# Patient Record
Sex: MALE | Race: AMERICAN INDIAN OR ALASKA NATIVE
[De-identification: names, ages, dates, MRNs, and addresses within clinical notes are randomized per-mention and may not be internally consistent; named-entity substitution may affect disease eponyms.]

---

## 2018-05-28 ENCOUNTER — HOSPITAL ENCOUNTER (EMERGENCY)
Dept: HOSPITAL 5 - ED | Age: 23
Discharge: HOME | End: 2018-05-28
Payer: SELF-PAY

## 2018-05-28 VITALS — SYSTOLIC BLOOD PRESSURE: 105 MMHG | DIASTOLIC BLOOD PRESSURE: 66 MMHG

## 2018-05-28 DIAGNOSIS — R21: Primary | ICD-10-CM

## 2018-05-28 DIAGNOSIS — R94.31: ICD-10-CM

## 2018-05-28 LAB
BUN SERPL-MCNC: 16 MG/DL (ref 9–20)
BUN/CREAT SERPL: 27 %
CALCIUM SERPL-MCNC: 9 MG/DL (ref 8.4–10.2)
HCT VFR BLD CALC: 51.6 % (ref 35.5–45.6)
HEMOLYSIS INDEX: 94
HGB BLD-MCNC: 16.4 GM/DL (ref 11.8–15.2)
MCH RBC QN AUTO: 26 PG (ref 28–32)
MCHC RBC AUTO-ENTMCNC: 32 % (ref 32–34)
MCV RBC AUTO: 81 FL (ref 84–94)
PLATELET # BLD: 207 K/MM3 (ref 140–440)
RBC # BLD AUTO: 6.38 M/MM3 (ref 3.65–5.03)

## 2018-05-28 PROCEDURE — 71045 X-RAY EXAM CHEST 1 VIEW: CPT

## 2018-05-28 PROCEDURE — 80048 BASIC METABOLIC PNL TOTAL CA: CPT

## 2018-05-28 PROCEDURE — 36415 COLL VENOUS BLD VENIPUNCTURE: CPT

## 2018-05-28 PROCEDURE — 85027 COMPLETE CBC AUTOMATED: CPT

## 2018-05-28 PROCEDURE — 93010 ELECTROCARDIOGRAM REPORT: CPT

## 2018-05-28 PROCEDURE — 84484 ASSAY OF TROPONIN QUANT: CPT

## 2018-05-28 PROCEDURE — 82550 ASSAY OF CK (CPK): CPT

## 2018-05-28 PROCEDURE — 99284 EMERGENCY DEPT VISIT MOD MDM: CPT

## 2018-05-28 PROCEDURE — 96374 THER/PROPH/DIAG INJ IV PUSH: CPT

## 2018-05-28 PROCEDURE — 96375 TX/PRO/DX INJ NEW DRUG ADDON: CPT

## 2018-05-28 PROCEDURE — 93005 ELECTROCARDIOGRAM TRACING: CPT

## 2018-05-28 NOTE — EMERGENCY DEPARTMENT REPORT
ED General Adult HPI





- General


Chief complaint: Allergic Reaction


Stated complaint: ALLERGIC REACTION


Time Seen by Provider: 05/28/18 06:56


Source: patient, RN notes reviewed


Mode of arrival: Ambulatory


Limitations: No Limitations





- History of Present Illness


Initial comments: 





This is a 22-year-old male who is unknown to this provider, presenting to the 

ER with an allergic reaction.  He complains of diffuse body rash, which has 

been present for 2 days, started on his chest, then went to his arms and went 

to his legs.  He and also endorses some lip swelling.  There is no intraoral 

involvement.  








 his symptoms are constant, they do not radiate anywhere, and he endorses no 

exacerbating or relieving factors.





He specifically endorses no new creams, colognes, travel, although he thinks he 

recently had exposure to pets at a friend's house.  Patient also indicates 

central chest tightness which has been present for a few hours, it does not 

radiate anywhere, and has no exacerbating or relieving factors, he further 

endorses no cocaine use, recent aspirin use, DVT or pulmonary embolus risk 

factors.




















-: Gradual


Location: mouth, chest, back, abdomen, left, right, upper extremity, lower 

extremity


Severity scale (0 -10): 4


Consistency: constant


Improves with: none


Worsens with: none


Associated Symptoms: denies other symptoms, chest pain, rash.  denies: confusion

, cough, diaphoresis, fever/chills, headaches, loss of appetite, malaise, nausea

/vomiting, seizure, shortness of breath, syncope, weakness





- Related Data


 Previous Rx's











 Medication  Instructions  Recorded  Last Taken  Type


 


Acetaminophen/Codeine [Tylenol #3] 1 tab PO Q8H PRN #15 tablet 08/06/15 Unknown 

Rx


 


Penicillin Vk [Veetids TAB] 500 mg PO Q8H #30 tablet 08/06/15 Unknown Rx


 


EPINEPHrine [Epipen 2-Jayson] 0.3 mg IM DAILY PRN #2 ml 05/28/18 Unknown Rx


 


Famotidine [Pepcid] 20 mg PO BID #10 tablet 05/28/18 Unknown Rx


 


diphenhydrAMINE [Benadryl] 50 mg PO Q8HR PRN #20 capsule 05/28/18 Unknown Rx


 


predniSONE [Deltasone] 40 mg PO QDAY #8 tab 05/28/18 Unknown Rx











 Allergies











Allergy/AdvReac Type Severity Reaction Status Date / Time


 


No Known Allergies Allergy   Verified 08/05/15 20:37














ED Review of Systems


ROS: 


Stated complaint: ALLERGIC REACTION


Other details as noted in HPI





Comment: All other systems reviewed and negative





ED Past Medical Hx





- Past Medical History


Previous Medical History?: No





- Surgical History


Past Surgical History?: No





- Social History


Smoking Status: Never Smoker


Substance Use Type: None





- Medications


Home Medications: 


 Home Medications











 Medication  Instructions  Recorded  Confirmed  Last Taken  Type


 


Acetaminophen/Codeine [Tylenol #3] 1 tab PO Q8H PRN #15 tablet 08/06/15  

Unknown Rx


 


Penicillin Vk [Veetids TAB] 500 mg PO Q8H #30 tablet 08/06/15  Unknown Rx


 


EPINEPHrine [Epipen 2-Jayson] 0.3 mg IM DAILY PRN #2 ml 05/28/18  Unknown Rx


 


Famotidine [Pepcid] 20 mg PO BID #10 tablet 05/28/18  Unknown Rx


 


diphenhydrAMINE [Benadryl] 50 mg PO Q8HR PRN #20 capsule 05/28/18  Unknown Rx


 


predniSONE [Deltasone] 40 mg PO QDAY #8 tab 05/28/18  Unknown Rx














ED Physical Exam





- General


Limitations: No Limitations


General appearance: alert, in no apparent distress





- Head


Head exam: Present: atraumatic, normocephalic





- Eye


Eye exam: Present: normal appearance, PERRL, EOMI.  Absent: nystagmus





- ENT


ENT exam: Present: normal exam, normal orophraynx, mucous membranes moist, 

normal external ear exam, other (there is minimal superficial lip swelling.  

There is no stridor or trismus.  There is no swelling of the tongue.  There is 

no uvula involvement.  The patient is speaking in full sentences)





- Neck


Neck exam: Present: normal inspection, full ROM





- Respiratory


Respiratory exam: Present: normal lung sounds bilaterally.  Absent: respiratory 

distress





- Cardiovascular


Cardiovascular Exam: Present: regular rate, normal rhythm, normal heart sounds.

  Absent: bradycardia, tachycardia, irregular rhythm, systolic murmur, 

diastolic murmur, rubs, gallop





- GI/Abdominal


GI/Abdominal exam: Present: soft, normal bowel sounds.  Absent: distended, 

tenderness, guarding, rebound, rigid, pulsatile mass





- Rectal


Rectal exam: Present: deferred





- Extremities Exam


Extremities exam: Present: full ROM, normal capillary refill.  Absent: 

tenderness, pedal edema, joint swelling, calf tenderness





- Back Exam


Back exam: Present: normal inspection, full ROM.  Absent: tenderness, CVA 

tenderness (R), paraspinal tenderness, vertebral tenderness





- Neurological Exam


Neurological exam: Present: alert, oriented X3, CN II-XII intact, normal gait, 

other (Extraocular movements intact.  Tongue midline.  No facial droop.  Facial 

sensation intact to light touch in the V1, V2, V3 distribution bilaterally.  5 

and 5 strength in 4 extremities..  Sensation is intact to light touch in 4 

extremities.).  Absent: motor sensory deficit





- Psychiatric


Psychiatric exam: Present: normal affect, normal mood





- Skin


Skin exam: Present: warm, rash, urticaria, other (diffuse well-circumscribed 

macules which are nontender and saniya.  These are on the arms, legs, chest, 

abdomen.  There is no purpura.  There is no ecchymosis.)





ED Course


 Vital Signs











  05/28/18 05/28/18 05/28/18





  06:17 06:29 06:46


 


Temperature 97.8 F 97.8 F 


 


Pulse Rate 110 H 114 H 85


 


Respiratory 18 17 19





Rate   


 


Blood Pressure 84/57 103/55 103/68


 


Blood Pressure   





[Right]   


 


O2 Sat by Pulse 99 100 92





Oximetry   














  05/28/18





  06:53


 


Temperature 97.9 F


 


Pulse Rate 79


 


Respiratory 19





Rate 


 


Blood Pressure 


 


Blood Pressure 103/68





[Right] 


 


O2 Sat by Pulse 97





Oximetry 














- Reevaluation(s)


Reevaluation #1: 





05/28/18 11:51


Troponin negative 2, EKG unchanged 2, patient resting comfortably in no 

distress, he will be discharged to follow up, return precautions are reviewed.





ED Medical Decision Making





- Lab Data


Result diagrams: 


 05/28/18 07:30





 05/28/18 07:30








 Vital Signs











  05/28/18 05/28/18 05/28/18





  06:17 06:29 06:46


 


Temperature 97.8 F 97.8 F 


 


Pulse Rate 110 H 114 H 85


 


Respiratory 18 17 19





Rate   


 


Blood Pressure 84/57 103/55 103/68


 


Blood Pressure   





[Right]   


 


O2 Sat by Pulse 99 100 92





Oximetry   














  05/28/18





  06:53


 


Temperature 97.9 F


 


Pulse Rate 79


 


Respiratory 19





Rate 


 


Blood Pressure 


 


Blood Pressure 103/68





[Right] 


 


O2 Sat by Pulse 97





Oximetry 











 Lab Results











  05/28/18 05/28/18 05/28/18 Range/Units





  07:30 07:30 07:30 


 


WBC   12.7 H   (4.5-11.0)  K/mm3


 


RBC   6.38 H   (3.65-5.03)  M/mm3


 


Hgb   16.4 H   (11.8-15.2)  gm/dl


 


Hct   51.6 H   (35.5-45.6)  %


 


MCV   81 L   (84-94)  fl


 


MCH   26 L   (28-32)  pg


 


MCHC   32   (32-34)  %


 


RDW   14.8   (13.2-15.2)  %


 


Plt Count   207   (140-440)  K/mm3


 


Sodium  134 L    (137-145)  mmol/L


 


Potassium  4.5    (3.6-5.0)  mmol/L


 


Chloride  95.8 L    ()  mmol/L


 


Carbon Dioxide  28    (22-30)  mmol/L


 


Anion Gap  15    mmol/L


 


BUN  16    (9-20)  mg/dL


 


Creatinine  0.6 L    (0.8-1.5)  mg/dL


 


Estimated GFR  > 60    ml/min


 


BUN/Creatinine Ratio  27    %


 


Glucose  103 H    ()  mg/dL


 


Calcium  9.0    (8.4-10.2)  mg/dL


 


Total Creatine Kinase    57  ()  units/L


 


Troponin T  < 0.010    (0.00-0.029)  ng/mL














  05/28/18 Range/Units





  09:10 


 


WBC   (4.5-11.0)  K/mm3


 


RBC   (3.65-5.03)  M/mm3


 


Hgb   (11.8-15.2)  gm/dl


 


Hct   (35.5-45.6)  %


 


MCV   (84-94)  fl


 


MCH   (28-32)  pg


 


MCHC   (32-34)  %


 


RDW   (13.2-15.2)  %


 


Plt Count   (140-440)  K/mm3


 


Sodium   (137-145)  mmol/L


 


Potassium   (3.6-5.0)  mmol/L


 


Chloride   ()  mmol/L


 


Carbon Dioxide   (22-30)  mmol/L


 


Anion Gap   mmol/L


 


BUN   (9-20)  mg/dL


 


Creatinine   (0.8-1.5)  mg/dL


 


Estimated GFR   ml/min


 


BUN/Creatinine Ratio   %


 


Glucose   ()  mg/dL


 


Calcium   (8.4-10.2)  mg/dL


 


Total Creatine Kinase   ()  units/L


 


Troponin T  < 0.010  (0.00-0.029)  ng/mL














- EKG Data


-: EKG Interpreted by Me





- EKG Data


When compared to previous EKG there are: previous EKG unavailable





05/28/18 09:04


Normal sinus, 89 bpm, normal axis, QTC prolonged, incomplete right bundle-

branch block, borderline atrial enlargement, abnormal EKG, not consistent with 

a STEMI





- Radiology Data


Radiology results: pending, image reviewed


interpreted by me: 





X-ray of the chest, interpreted by me, no acute disease





- Medical Decision Making











Differential diagnosis, including but not limited to: Allergic reaction, 

pneumonia, acute coronary syndrome, GERD, gastritis, hiatal hernia











Assessment and plan: 22-year-old male with 2 complaints.  His initial complaint 

appears to be consistent with an undifferentiated allergic reaction.  He has 

diffuse macules, no lesions on the palms or soles, and superficial lip swelling 

without any intraoral involvement.  He is afebrile with reassuring vital signs, 

clinically sober, and speaking in full sentences.  He'll be treated with an 

allergy cocktail and observed.











Patient also describes nonspecific chest tightness which started at 7:00 in the 

morning.  There are no DVT or pulmonary embolus risk factors he is low risk by 

well's criteria, he is low risk by heart score, he is low risk by ADELFO score.  

We will obtain EKG 2, troponin 2, he can follow up with outpatient cardiology 

for his incidental abnormal EKG.


Critical care attestation.: 


If time is entered above; I have spent that time in minutes in the direct care 

of this critically ill patient, excluding procedure time.








ED Disposition


Clinical Impression: 


 Abnormal EKG, Rash





Disposition: DC-01 TO HOME OR SELFCARE


Is pt being admited?: No


Does the pt Need Aspirin: No


Condition: Stable


Instructions:  Electrocardiogram (GEN), Anaphylaxis (ED)


Additional Instructions: 


Follow-up with a cardiologist within the next week for your abnormal EKG.  Take 

the medications as directed.








Follow up with the primary care doctor within the next month.  Return to the ER 

right away with new pain, worsened pain, migration of pain, fevers, chills, 

lethargy, irritability, projectile vomiting and change in mental status, 

confusion, inability to tolerate feeds.


Referrals: 


PRIMARY CARE,MD [Primary Care Provider] - 3-5 Days


[facility] [Provider Group] - 3-5 Days


[facility] [Provider Group] - 3-5 Days


CHRISSY SOTELO MD [Staff Physician] - 3-5 Days

## 2020-10-08 ENCOUNTER — HOSPITAL ENCOUNTER (EMERGENCY)
Dept: HOSPITAL 5 - ED | Age: 25
Discharge: LEFT BEFORE DISCHARGE | End: 2020-10-08
Payer: SELF-PAY

## 2020-10-08 VITALS — SYSTOLIC BLOOD PRESSURE: 122 MMHG | DIASTOLIC BLOOD PRESSURE: 66 MMHG

## 2020-10-08 DIAGNOSIS — T65.91XA: ICD-10-CM
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
## 2020-10-08 DIAGNOSIS — R41.82: Primary | ICD-10-CM

## 2020-10-08 LAB
ALBUMIN SERPL-MCNC: 4.8 G/DL (ref 3.9–5)
ALT SERPL-CCNC: 15 UNITS/L (ref 7–56)
BASOPHILS # (AUTO): 0.1 K/MM3 (ref 0–0.1)
BASOPHILS NFR BLD AUTO: 0.8 % (ref 0–1.8)
BUN SERPL-MCNC: 15 MG/DL (ref 9–20)
BUN/CREAT SERPL: 17 %
CALCIUM SERPL-MCNC: 9.5 MG/DL (ref 8.4–10.2)
EOSINOPHIL # BLD AUTO: 0.1 K/MM3 (ref 0–0.4)
EOSINOPHIL NFR BLD AUTO: 1.6 % (ref 0–4.3)
HCT VFR BLD CALC: 42.2 % (ref 35.5–45.6)
HEMOLYSIS INDEX: 11
HGB BLD-MCNC: 13.6 GM/DL (ref 11.8–15.2)
LYMPHOCYTES # BLD AUTO: 2.5 K/MM3 (ref 1.2–5.4)
LYMPHOCYTES NFR BLD AUTO: 26.9 % (ref 13.4–35)
MCHC RBC AUTO-ENTMCNC: 32 % (ref 32–34)
MCV RBC AUTO: 79 FL (ref 84–94)
MONOCYTES # (AUTO): 0.7 K/MM3 (ref 0–0.8)
MONOCYTES % (AUTO): 7.2 % (ref 0–7.3)
PLATELET # BLD: 192 K/MM3 (ref 140–440)
RBC # BLD AUTO: 5.37 M/MM3 (ref 3.65–5.03)

## 2020-10-08 PROCEDURE — 36415 COLL VENOUS BLD VENIPUNCTURE: CPT

## 2020-10-08 PROCEDURE — 85025 COMPLETE CBC W/AUTO DIFF WBC: CPT

## 2020-10-08 PROCEDURE — 99285 EMERGENCY DEPT VISIT HI MDM: CPT

## 2020-10-08 PROCEDURE — 80320 DRUG SCREEN QUANTALCOHOLS: CPT

## 2020-10-08 PROCEDURE — 80053 COMPREHEN METABOLIC PANEL: CPT

## 2020-10-08 PROCEDURE — 93005 ELECTROCARDIOGRAM TRACING: CPT

## 2020-10-08 PROCEDURE — 82550 ASSAY OF CK (CPK): CPT

## 2020-10-08 PROCEDURE — G0480 DRUG TEST DEF 1-7 CLASSES: HCPCS

## 2020-10-08 PROCEDURE — 96360 HYDRATION IV INFUSION INIT: CPT

## 2020-10-08 PROCEDURE — 82140 ASSAY OF AMMONIA: CPT

## 2020-10-08 PROCEDURE — 70450 CT HEAD/BRAIN W/O DYE: CPT

## 2020-10-08 NOTE — CAT SCAN REPORT
CT head/brain wo con



INDICATION:

Altered Mental Status.



TECHNIQUE:

All CT scans at this location are performed using CT dose reduction for ALARA by means of automated e
xposure control. 



COMPARISON:

None available.



FINDINGS:

Imaged paranasal and mastoid sinuses are clear. Ventricles are symmetrical and normal in size. No mas
s, hemorrhage or other significant abnormality.



IMPRESSION:

1. Negative study. 



Signer Name: Satnam Mcdonough MD 

Signed: 10/8/2020 3:21 AM

Workstation Name: [id number]

## 2020-10-08 NOTE — EMERGENCY DEPARTMENT REPORT
ED Altered Mental Status HPI





- General


Chief Complaint: Overdose


Stated Complaint: AMS


PUI?: No


Time Seen by Provider: 10/08/20 02:11


Source: EMS


Mode of arrival: Stretcher


Limitations: Altered Mental Status





- History of Present Illness


Initial Comments: 





Patient is a 24-year-old male that presents emergency room with EMS for ove

rdose, altered mental status.  EMS report received.  EMS states that the patient

was alert and oriented x2 and confused however once they arrived to the ER the 

patient became nonverbal and minimally responsive.  Patient was brought in from 

a local hotel.  Patient told EMS that he was using marijuana only.  





Patient is minimally responsive.  Patient is not respond to painful stimuli.  

Ammonia salts placed over the patient's nose and the patient woke up.  Patient 

is verbal.  Patient is now alert and oriented x3.  Patient states he was using 

meth and marijuana and then took GHB to come down..











MD Complaint: altered mental status, decreased responsiveness


-: Sudden


Severity: severe


Consistency of Symptoms: waxing and waning, constant





- Related Data


                                    Allergies











Allergy/AdvReac Type Severity Reaction Status Date / Time


 


No Known Allergies Allergy   Unverified 10/08/20 03:19














ED Review of Systems


ROS: 


Stated complaint: AMS


Other details as noted in HPI





Comment: All other systems reviewed and negative





ED Past Medical Hx





- Past Medical History


Previous Medical History?: No


Additional medical history: Unknown





- Surgical History


Past Surgical History?: No


Additional Surgical History: Unknown





- Family History


Family history: no significant





- Social History


Smoking Status: Unknown if ever smoked


Substance Use Type: Marijuana, Methamphetamines





ED Physical Exam





- General


Limitations: Altered Mental Status


General appearance: alert, in no apparent distress





- Head


Head exam: Present: atraumatic, normocephalic





- Eye


Eye exam: Present: normal appearance, PERRL


Pupils: Present: normal accommodation





- ENT


ENT exam: Present: mucous membranes dry





- Neck


Neck exam: Present: normal inspection





- Respiratory


Respiratory exam: Present: normal lung sounds bilaterally.  Absent: respiratory 

distress





- Cardiovascular


Cardiovascular Exam: Present: regular rate, normal rhythm.  Absent: systolic 

murmur, diastolic murmur, rubs, gallop





- GI/Abdominal


GI/Abdominal exam: Present: soft, normal bowel sounds





- Rectal


Rectal exam: Present: deferred





- Extremities Exam


Extremities exam: Present: normal inspection





- Back Exam


Back exam: Present: normal inspection





- Neurological Exam


Neurological exam: Present: alert, oriented X3





- Psychiatric


Psychiatric exam: Present: normal affect, normal mood





- Skin


Skin exam: Present: warm, dry, intact, normal color.  Absent: rash





- Assessment


Assessment Interval: Baseline





- Level of Consciousness


1a. Level of Consciousness: arousable/minor stimuli





- LOC Questions


1b. LOC Questions: answers 1 question correctly





- LOC Command


1c. LOC Commands: performs 1 task correctly





- Best Gaze


2. Best Gaze: normal





- Visual


3. Visual: no visual loss





- Facial Palsy


4. Facial Palsy: normal symmetrical movement





- Motor Arm


5a. Motor Arm Left: no drift


5b. Motor Arm Right: no drift





- Motor Leg


6a. Motor Leg Left: no drift


6b. Motor Leg Right: no drift





- Limb Ataxia


7. Limb Ataxia: absent





- Sensory


8. Sensory: normal





- Best Language


9. Best Language: no aphasia





- Dysarthria


10. Dysarthria: normal





- Extinction and Inattention


11. Extinction/Inattention: no abnormality





- Scoring


Total Score: 3


Stroke Severity: Minor Stroke





ED Course


                                   Vital Signs











  10/08/20 10/08/20 10/08/20





  02:00 03:20 03:23


 


Temperature 97.6 F  


 


Pulse Rate 86  87


 


Respiratory 13 13 15





Rate   


 


Blood Pressure 139/86  112/69





[Left]   


 


O2 Sat by Pulse 97 98 98





Oximetry   














  10/08/20





  03:46


 


Temperature 


 


Pulse Rate 94 H


 


Respiratory 13





Rate 


 


Blood Pressure 122/66





[Left] 


 


O2 Sat by Pulse 100





Oximetry 














- Reevaluation(s)


Reevaluation #1: 


Some labs pending and head CT results pending.





Patient is alert and oriented x4.  Patient states he was doing drugs tonight.  

Patient states he took meth and GHB.  Patient states he was trying to get high. 

 Patient denies suicidal and homicidal ideations.  Patient states he had no 

intention of hurting himself.  Patient answering all questions appropriately.  

Patient is awake.  Patient states he does not want to be in the hospital 

anymore.  Patient states he would like to leave.  I discussed the risk with the 

patient of leaving the hospital prior to having all results and the patient 

voiced understanding the risk.  Patient signed AMA form.  The nurse witnessed 

the signing of the form and discussion.  Patient left the hospital AGAINST 

MEDICAL ADVICE.  Even though the patient is leaving AGAINST MEDICAL ADVICE, the 

patient will be given discharge instructions.  





I discussed all results and clinical findings with patient.   Patient given 

discharge instructions.  Patient voiced understanding of discharge instructions.


10/08/20 03:34




















- Lab Data


Result diagrams: 


                                 10/08/20 03:08





                                 10/08/20 03:08


                                   Lab Results











  10/08/20 10/08/20 10/08/20 Range/Units





  03:08 03:08 03:08 


 


WBC  9.3    (4.5-11.0)  K/mm3


 


RBC  5.37 H    (3.65-5.03)  M/mm3


 


Hgb  13.6    (11.8-15.2)  gm/dl


 


Hct  42.2    (35.5-45.6)  %


 


MCV  79 L    (84-94)  fl


 


MCH  25 L    (28-32)  pg


 


MCHC  32    (32-34)  %


 


RDW  17.3 H    (13.2-15.2)  %


 


Plt Count  192    (140-440)  K/mm3


 


Lymph % (Auto)  26.9    (13.4-35.0)  %


 


Mono % (Auto)  7.2    (0.0-7.3)  %


 


Eos % (Auto)  1.6    (0.0-4.3)  %


 


Baso % (Auto)  0.8    (0.0-1.8)  %


 


Lymph # (Auto)  2.5    (1.2-5.4)  K/mm3


 


Mono # (Auto)  0.7    (0.0-0.8)  K/mm3


 


Eos # (Auto)  0.1    (0.0-0.4)  K/mm3


 


Baso # (Auto)  0.1    (0.0-0.1)  K/mm3


 


Seg Neutrophils %  63.5    (40.0-70.0)  %


 


Seg Neutrophils #  5.9    (1.8-7.7)  K/mm3


 


Sodium   138   (137-145)  mmol/L


 


Potassium   4.0   (3.6-5.0)  mmol/L


 


Chloride   98.6   ()  mmol/L


 


Carbon Dioxide   22   (22-30)  mmol/L


 


Anion Gap   21   mmol/L


 


BUN   15   (9-20)  mg/dL


 


Creatinine   0.9   (0.8-1.3)  mg/dL


 


Estimated GFR   > 60   ml/min


 


BUN/Creatinine Ratio   17   %


 


Glucose   106 H   ()  mg/dL


 


Lactic Acid    1.00  (0.7-2.0)  mmol/L


 


Calcium   9.5   (8.4-10.2)  mg/dL


 


Total Bilirubin   0.50   (0.1-1.2)  mg/dL


 


AST   25   (5-40)  units/L


 


ALT   15   (7-56)  units/L


 


Alkaline Phosphatase   73   ()  units/L


 


Total Creatine Kinase   547 H   ()  units/L


 


Total Protein   8.6 H   (6.3-8.2)  g/dL


 


Albumin   4.8   (3.9-5)  g/dL


 


Albumin/Globulin Ratio   1.3   %


 


Salicylates     (2.8-20.0)  mg/dL


 


Acetaminophen     (10.0-30.0)  ug/mL


 


Plasma/Serum Alcohol     (0-0.07)  %














  10/08/20 10/08/20 10/08/20 Range/Units





  03:08 03:08 03:08 


 


WBC     (4.5-11.0)  K/mm3


 


RBC     (3.65-5.03)  M/mm3


 


Hgb     (11.8-15.2)  gm/dl


 


Hct     (35.5-45.6)  %


 


MCV     (84-94)  fl


 


MCH     (28-32)  pg


 


MCHC     (32-34)  %


 


RDW     (13.2-15.2)  %


 


Plt Count     (140-440)  K/mm3


 


Lymph % (Auto)     (13.4-35.0)  %


 


Mono % (Auto)     (0.0-7.3)  %


 


Eos % (Auto)     (0.0-4.3)  %


 


Baso % (Auto)     (0.0-1.8)  %


 


Lymph # (Auto)     (1.2-5.4)  K/mm3


 


Mono # (Auto)     (0.0-0.8)  K/mm3


 


Eos # (Auto)     (0.0-0.4)  K/mm3


 


Baso # (Auto)     (0.0-0.1)  K/mm3


 


Seg Neutrophils %     (40.0-70.0)  %


 


Seg Neutrophils #     (1.8-7.7)  K/mm3


 


Sodium     (137-145)  mmol/L


 


Potassium     (3.6-5.0)  mmol/L


 


Chloride     ()  mmol/L


 


Carbon Dioxide     (22-30)  mmol/L


 


Anion Gap     mmol/L


 


BUN     (9-20)  mg/dL


 


Creatinine     (0.8-1.3)  mg/dL


 


Estimated GFR     ml/min


 


BUN/Creatinine Ratio     %


 


Glucose     ()  mg/dL


 


Lactic Acid     (0.7-2.0)  mmol/L


 


Calcium     (8.4-10.2)  mg/dL


 


Total Bilirubin     (0.1-1.2)  mg/dL


 


AST     (5-40)  units/L


 


ALT     (7-56)  units/L


 


Alkaline Phosphatase     ()  units/L


 


Total Creatine Kinase     ()  units/L


 


Total Protein     (6.3-8.2)  g/dL


 


Albumin     (3.9-5)  g/dL


 


Albumin/Globulin Ratio     %


 


Salicylates  < 0.3 L    (2.8-20.0)  mg/dL


 


Acetaminophen   5.0 L   (10.0-30.0)  ug/mL


 


Plasma/Serum Alcohol    < 0.01  (0-0.07)  %














- Radiology Data


Radiology results: report reviewed








 CT head/brain wo con 





INDICATION: 


Altered Mental Status. 





TECHNIQUE: 


All CT scans at this location are performed using CT dose reduction for ALARA by

 means of automated


 exposure control. 





COMPARISON: 


None available. 





FINDINGS: 


Imaged paranasal and mastoid sinuses are clear. Ventricles are symmetrical and 

normal in size. No 


 mass, hemorrhage or other significant abnormality. 





IMPRESSION: 


1. Negative study. 











- Medical Decision Making





Patient is a 24-year-old male who is brought in by EMS for altered mental status

 and overdose.  Patient altered.  Patient upon initial evaluation found to be 

minimally responsive and ammonia salts were used and the patient immediately 

woke up and was alert and oriented x4.  Patient admitted to taking GHB and meth.

  Patient overdosed secondary to try to get high and the patient denies any 

intention of hurting himself or suicidal ideations.  Patient alert awake and 

oriented and the patient did not want to stay in the hospital any longer and the

 patient signed out AMA.  Patient had labs pending and his head CT was still 

pending.  Patient said he did not want to wait for the results.  Patient signed 

AMA form.  Patient left the hospital AGAINST MEDICAL ADVICE.





After the patient left the hospital, the patient's labs came back and the 

patient's head CT came back.  Patient's head CT is negative for acute findings. 

 Patient's labs are essentially unremarkable.








- Differential Diagnosis


Overdose, accidental overdose.


Critical Care Time: Yes


Critical care time in (mins) excluding proc time.: 35


Critical care attestation.: 


If time is entered above; I have spent that time in minutes in the direct care 

of this critically ill patient, excluding procedure time.





Critical Care Time: 





35 minutes





ED Disposition


Clinical Impression: 


Overdose


Qualifiers:


 Encounter type: initial encounter Injury intent: accidental or unintentional 

Qualified Code(s): T50.901A - Poisoning by unspecified drugs, medicaments and 

biological substances, accidental (unintentional), initial encounter





Altered mental state


Qualifiers:


 Altered mental status type: unspecified Qualified Code(s): R41.82 - Altered 

mental status, unspecified





Disposition: DC-07 LEFT AGAINST MED ADVICE


Is pt being admited?: No


Does the pt Need Aspirin: No


Condition: Critical


Instructions:  Methamphetamine Abuse (ED)


Additional Instructions: 


Patient to follow-up with primary care in 2 to 3 days.  Patient to follow-up 

with rehabilitation center in 2 to 3 days.  Patient to rest.  Patient to 

increase water.  Patient to avoid drug use and alcohol use.  Patient to take 

Tylenol or ibuprofen as needed for pain.  Patient to return to the ER if 

condition worsens, changes or new symptoms arise.


Referrals: 


PRIMARY CARE,MD [Primary Care Provider] - 2-3 Days


Forms:  AMA Form


Time of Disposition: 03:37